# Patient Record
Sex: FEMALE | Race: OTHER | HISPANIC OR LATINO | URBAN - METROPOLITAN AREA
[De-identification: names, ages, dates, MRNs, and addresses within clinical notes are randomized per-mention and may not be internally consistent; named-entity substitution may affect disease eponyms.]

---

## 2023-06-21 ENCOUNTER — INPATIENT (INPATIENT)
Facility: HOSPITAL | Age: 28
LOS: 1 days | Discharge: HOME | End: 2023-06-23
Attending: OBSTETRICS & GYNECOLOGY | Admitting: OBSTETRICS & GYNECOLOGY
Payer: COMMERCIAL

## 2023-06-21 VITALS — DIASTOLIC BLOOD PRESSURE: 62 MMHG | TEMPERATURE: 98 F | SYSTOLIC BLOOD PRESSURE: 111 MMHG

## 2023-06-21 LAB
BASOPHILS # BLD AUTO: 0.07 K/UL — SIGNIFICANT CHANGE UP (ref 0–0.2)
BASOPHILS NFR BLD AUTO: 0.5 % — SIGNIFICANT CHANGE UP (ref 0–2)
BLD GP AB SCN SERPL QL: NEGATIVE — SIGNIFICANT CHANGE UP
COVID-19 SPIKE DOMAIN AB INTERP: POSITIVE
COVID-19 SPIKE DOMAIN ANTIBODY RESULT: >250 U/ML — HIGH
EOSINOPHIL # BLD AUTO: 0.39 K/UL — SIGNIFICANT CHANGE UP (ref 0–0.5)
EOSINOPHIL NFR BLD AUTO: 2.7 % — SIGNIFICANT CHANGE UP (ref 0–6)
HCT VFR BLD CALC: 38 % — SIGNIFICANT CHANGE UP (ref 34.5–45)
HGB BLD-MCNC: 11.8 G/DL — SIGNIFICANT CHANGE UP (ref 11.5–15.5)
IMM GRANULOCYTES NFR BLD AUTO: 1.7 % — HIGH (ref 0–0.9)
LYMPHOCYTES # BLD AUTO: 2.97 K/UL — SIGNIFICANT CHANGE UP (ref 1–3.3)
LYMPHOCYTES # BLD AUTO: 20.6 % — SIGNIFICANT CHANGE UP (ref 13–44)
MCHC RBC-ENTMCNC: 25.7 PG — LOW (ref 27–34)
MCHC RBC-ENTMCNC: 31.1 GM/DL — LOW (ref 32–36)
MCV RBC AUTO: 82.6 FL — SIGNIFICANT CHANGE UP (ref 80–100)
MONOCYTES # BLD AUTO: 1.01 K/UL — HIGH (ref 0–0.9)
MONOCYTES NFR BLD AUTO: 7 % — SIGNIFICANT CHANGE UP (ref 2–14)
NEUTROPHILS # BLD AUTO: 9.76 K/UL — HIGH (ref 1.8–7.4)
NEUTROPHILS NFR BLD AUTO: 67.5 % — SIGNIFICANT CHANGE UP (ref 43–77)
NRBC # BLD: 0 /100 WBCS — SIGNIFICANT CHANGE UP (ref 0–0)
PLATELET # BLD AUTO: 336 K/UL — SIGNIFICANT CHANGE UP (ref 150–400)
RBC # BLD: 4.6 M/UL — SIGNIFICANT CHANGE UP (ref 3.8–5.2)
RBC # FLD: 13.6 % — SIGNIFICANT CHANGE UP (ref 10.3–14.5)
RH IG SCN BLD-IMP: POSITIVE — SIGNIFICANT CHANGE UP
SARS-COV-2 IGG+IGM SERPL QL IA: >250 U/ML — HIGH
SARS-COV-2 IGG+IGM SERPL QL IA: POSITIVE
WBC # BLD: 14.45 K/UL — HIGH (ref 3.8–10.5)
WBC # FLD AUTO: 14.45 K/UL — HIGH (ref 3.8–10.5)

## 2023-06-21 DEVICE — ARISTA 3GR: Type: IMPLANTABLE DEVICE | Status: FUNCTIONAL

## 2023-06-21 RX ORDER — CEFAZOLIN SODIUM 1 G
2000 VIAL (EA) INJECTION ONCE
Refills: 0 | Status: COMPLETED | OUTPATIENT
Start: 2023-06-21 | End: 2023-06-21

## 2023-06-21 RX ORDER — OXYTOCIN 10 UNIT/ML
333.33 VIAL (ML) INJECTION
Qty: 20 | Refills: 0 | Status: DISCONTINUED | OUTPATIENT
Start: 2023-06-21 | End: 2023-06-21

## 2023-06-21 RX ORDER — CITRIC ACID/SODIUM CITRATE 300-500 MG
30 SOLUTION, ORAL ORAL ONCE
Refills: 0 | Status: COMPLETED | OUTPATIENT
Start: 2023-06-21 | End: 2023-06-21

## 2023-06-21 RX ORDER — OXYCODONE HYDROCHLORIDE 5 MG/1
5 TABLET ORAL ONCE
Refills: 0 | Status: DISCONTINUED | OUTPATIENT
Start: 2023-06-21 | End: 2023-06-23

## 2023-06-21 RX ORDER — ACETAMINOPHEN 500 MG
975 TABLET ORAL
Refills: 0 | Status: DISCONTINUED | OUTPATIENT
Start: 2023-06-21 | End: 2023-06-23

## 2023-06-21 RX ORDER — LANOLIN
1 OINTMENT (GRAM) TOPICAL EVERY 6 HOURS
Refills: 0 | Status: DISCONTINUED | OUTPATIENT
Start: 2023-06-21 | End: 2023-06-23

## 2023-06-21 RX ORDER — SODIUM CHLORIDE 9 MG/ML
1000 INJECTION, SOLUTION INTRAVENOUS ONCE
Refills: 0 | Status: DISCONTINUED | OUTPATIENT
Start: 2023-06-21 | End: 2023-06-21

## 2023-06-21 RX ORDER — OXYCODONE HYDROCHLORIDE 5 MG/1
5 TABLET ORAL
Refills: 0 | Status: DISCONTINUED | OUTPATIENT
Start: 2023-06-21 | End: 2023-06-23

## 2023-06-21 RX ORDER — OXYTOCIN 10 UNIT/ML
333.33 VIAL (ML) INJECTION
Qty: 20 | Refills: 0 | Status: DISCONTINUED | OUTPATIENT
Start: 2023-06-21 | End: 2023-06-23

## 2023-06-21 RX ORDER — ENOXAPARIN SODIUM 100 MG/ML
40 INJECTION SUBCUTANEOUS EVERY 24 HOURS
Refills: 0 | Status: DISCONTINUED | OUTPATIENT
Start: 2023-06-22 | End: 2023-06-23

## 2023-06-21 RX ORDER — SIMETHICONE 80 MG/1
80 TABLET, CHEWABLE ORAL EVERY 4 HOURS
Refills: 0 | Status: DISCONTINUED | OUTPATIENT
Start: 2023-06-21 | End: 2023-06-23

## 2023-06-21 RX ORDER — NALOXONE HYDROCHLORIDE 4 MG/.1ML
0.1 SPRAY NASAL
Refills: 0 | Status: DISCONTINUED | OUTPATIENT
Start: 2023-06-21 | End: 2023-06-21

## 2023-06-21 RX ORDER — DEXAMETHASONE 0.5 MG/5ML
4 ELIXIR ORAL EVERY 6 HOURS
Refills: 0 | Status: DISCONTINUED | OUTPATIENT
Start: 2023-06-21 | End: 2023-06-21

## 2023-06-21 RX ORDER — FAMOTIDINE 10 MG/ML
20 INJECTION INTRAVENOUS ONCE
Refills: 0 | Status: COMPLETED | OUTPATIENT
Start: 2023-06-21 | End: 2023-06-21

## 2023-06-21 RX ORDER — ONDANSETRON 8 MG/1
4 TABLET, FILM COATED ORAL EVERY 6 HOURS
Refills: 0 | Status: DISCONTINUED | OUTPATIENT
Start: 2023-06-21 | End: 2023-06-21

## 2023-06-21 RX ORDER — MAGNESIUM HYDROXIDE 400 MG/1
30 TABLET, CHEWABLE ORAL
Refills: 0 | Status: DISCONTINUED | OUTPATIENT
Start: 2023-06-21 | End: 2023-06-23

## 2023-06-21 RX ORDER — SODIUM CHLORIDE 9 MG/ML
1000 INJECTION, SOLUTION INTRAVENOUS
Refills: 0 | Status: DISCONTINUED | OUTPATIENT
Start: 2023-06-21 | End: 2023-06-23

## 2023-06-21 RX ORDER — SODIUM CHLORIDE 9 MG/ML
1000 INJECTION, SOLUTION INTRAVENOUS
Refills: 0 | Status: DISCONTINUED | OUTPATIENT
Start: 2023-06-21 | End: 2023-06-21

## 2023-06-21 RX ORDER — ACETAMINOPHEN 500 MG
1000 TABLET ORAL ONCE
Refills: 0 | Status: COMPLETED | OUTPATIENT
Start: 2023-06-21 | End: 2023-06-21

## 2023-06-21 RX ORDER — IBUPROFEN 200 MG
600 TABLET ORAL EVERY 6 HOURS
Refills: 0 | Status: COMPLETED | OUTPATIENT
Start: 2023-06-21 | End: 2024-05-19

## 2023-06-21 RX ORDER — TETANUS TOXOID, REDUCED DIPHTHERIA TOXOID AND ACELLULAR PERTUSSIS VACCINE, ADSORBED 5; 2.5; 8; 8; 2.5 [IU]/.5ML; [IU]/.5ML; UG/.5ML; UG/.5ML; UG/.5ML
0.5 SUSPENSION INTRAMUSCULAR ONCE
Refills: 0 | Status: DISCONTINUED | OUTPATIENT
Start: 2023-06-21 | End: 2023-06-23

## 2023-06-21 RX ORDER — KETOROLAC TROMETHAMINE 30 MG/ML
30 SYRINGE (ML) INJECTION EVERY 6 HOURS
Refills: 0 | Status: COMPLETED | OUTPATIENT
Start: 2023-06-21 | End: 2023-06-22

## 2023-06-21 RX ORDER — DIPHENHYDRAMINE HCL 50 MG
25 CAPSULE ORAL EVERY 6 HOURS
Refills: 0 | Status: DISCONTINUED | OUTPATIENT
Start: 2023-06-21 | End: 2023-06-23

## 2023-06-21 RX ADMIN — SODIUM CHLORIDE 125 MILLILITER(S): 9 INJECTION, SOLUTION INTRAVENOUS at 17:02

## 2023-06-21 RX ADMIN — Medication 400 MILLIGRAM(S): at 18:45

## 2023-06-21 RX ADMIN — Medication 975 MILLIGRAM(S): at 21:25

## 2023-06-21 RX ADMIN — Medication 100 MILLIGRAM(S): at 14:30

## 2023-06-21 RX ADMIN — Medication 975 MILLIGRAM(S): at 22:00

## 2023-06-21 RX ADMIN — Medication 30 MILLILITER(S): at 14:23

## 2023-06-21 RX ADMIN — SODIUM CHLORIDE 2000 MILLILITER(S): 9 INJECTION, SOLUTION INTRAVENOUS at 12:00

## 2023-06-21 RX ADMIN — FAMOTIDINE 20 MILLIGRAM(S): 10 INJECTION INTRAVENOUS at 14:22

## 2023-06-21 NOTE — PATIENT PROFILE OB - FUNCTIONAL ASSESSMENT - BASIC MOBILITY 6.
4-calculated by average/Not able to assess (calculate score using [facility] averaging method)

## 2023-06-22 LAB
BASOPHILS # BLD AUTO: 0 K/UL — SIGNIFICANT CHANGE UP (ref 0–0.2)
BASOPHILS NFR BLD AUTO: 0 % — SIGNIFICANT CHANGE UP (ref 0–2)
BURR CELLS BLD QL SMEAR: PRESENT — SIGNIFICANT CHANGE UP
EOSINOPHIL # BLD AUTO: 0 K/UL — SIGNIFICANT CHANGE UP (ref 0–0.5)
EOSINOPHIL NFR BLD AUTO: 0 % — SIGNIFICANT CHANGE UP (ref 0–6)
GIANT PLATELETS BLD QL SMEAR: PRESENT — SIGNIFICANT CHANGE UP
HCT VFR BLD CALC: 32.2 % — LOW (ref 34.5–45)
HGB BLD-MCNC: 9.9 G/DL — LOW (ref 11.5–15.5)
HYPOCHROMIA BLD QL: SLIGHT — SIGNIFICANT CHANGE UP
LYMPHOCYTES # BLD AUTO: 1.12 K/UL — SIGNIFICANT CHANGE UP (ref 1–3.3)
LYMPHOCYTES # BLD AUTO: 4.4 % — LOW (ref 13–44)
MANUAL SMEAR VERIFICATION: SIGNIFICANT CHANGE UP
MCHC RBC-ENTMCNC: 25.7 PG — LOW (ref 27–34)
MCHC RBC-ENTMCNC: 30.7 GM/DL — LOW (ref 32–36)
MCV RBC AUTO: 83.6 FL — SIGNIFICANT CHANGE UP (ref 80–100)
MONOCYTES # BLD AUTO: 2.64 K/UL — HIGH (ref 0–0.9)
MONOCYTES NFR BLD AUTO: 10.4 % — SIGNIFICANT CHANGE UP (ref 2–14)
NEUTROPHILS # BLD AUTO: 21.66 K/UL — HIGH (ref 1.8–7.4)
NEUTROPHILS NFR BLD AUTO: 85.2 % — HIGH (ref 43–77)
PLAT MORPH BLD: NORMAL — SIGNIFICANT CHANGE UP
PLATELET # BLD AUTO: 299 K/UL — SIGNIFICANT CHANGE UP (ref 150–400)
POIKILOCYTOSIS BLD QL AUTO: SLIGHT — SIGNIFICANT CHANGE UP
RBC # BLD: 3.85 M/UL — SIGNIFICANT CHANGE UP (ref 3.8–5.2)
RBC # FLD: 13.5 % — SIGNIFICANT CHANGE UP (ref 10.3–14.5)
RBC BLD AUTO: ABNORMAL
T PALLIDUM AB TITR SER: NEGATIVE — SIGNIFICANT CHANGE UP
WBC # BLD: 25.42 K/UL — HIGH (ref 3.8–10.5)
WBC # FLD AUTO: 25.42 K/UL — HIGH (ref 3.8–10.5)

## 2023-06-22 RX ORDER — IBUPROFEN 200 MG
600 TABLET ORAL EVERY 6 HOURS
Refills: 0 | Status: DISCONTINUED | OUTPATIENT
Start: 2023-06-22 | End: 2023-06-23

## 2023-06-22 RX ADMIN — Medication 975 MILLIGRAM(S): at 21:12

## 2023-06-22 RX ADMIN — Medication 600 MILLIGRAM(S): at 18:16

## 2023-06-22 RX ADMIN — Medication 30 MILLIGRAM(S): at 07:01

## 2023-06-22 RX ADMIN — Medication 1 TABLET(S): at 12:15

## 2023-06-22 RX ADMIN — Medication 975 MILLIGRAM(S): at 04:23

## 2023-06-22 RX ADMIN — Medication 30 MILLIGRAM(S): at 01:00

## 2023-06-22 RX ADMIN — ENOXAPARIN SODIUM 40 MILLIGRAM(S): 100 INJECTION SUBCUTANEOUS at 06:31

## 2023-06-22 RX ADMIN — Medication 975 MILLIGRAM(S): at 22:00

## 2023-06-22 RX ADMIN — Medication 975 MILLIGRAM(S): at 03:53

## 2023-06-22 RX ADMIN — Medication 30 MILLIGRAM(S): at 06:31

## 2023-06-22 RX ADMIN — Medication 975 MILLIGRAM(S): at 09:00

## 2023-06-22 RX ADMIN — Medication 975 MILLIGRAM(S): at 15:01

## 2023-06-22 RX ADMIN — Medication 30 MILLIGRAM(S): at 00:49

## 2023-06-22 RX ADMIN — Medication 30 MILLIGRAM(S): at 12:15

## 2023-06-23 VITALS
OXYGEN SATURATION: 98 % | DIASTOLIC BLOOD PRESSURE: 70 MMHG | SYSTOLIC BLOOD PRESSURE: 107 MMHG | TEMPERATURE: 99 F | RESPIRATION RATE: 18 BRPM | HEART RATE: 80 BPM

## 2023-06-23 PROCEDURE — 86900 BLOOD TYPING SEROLOGIC ABO: CPT

## 2023-06-23 PROCEDURE — C1889: CPT

## 2023-06-23 PROCEDURE — 85025 COMPLETE CBC W/AUTO DIFF WBC: CPT

## 2023-06-23 PROCEDURE — 59050 FETAL MONITOR W/REPORT: CPT

## 2023-06-23 PROCEDURE — 86780 TREPONEMA PALLIDUM: CPT

## 2023-06-23 PROCEDURE — 36415 COLL VENOUS BLD VENIPUNCTURE: CPT

## 2023-06-23 PROCEDURE — 86769 SARS-COV-2 COVID-19 ANTIBODY: CPT

## 2023-06-23 PROCEDURE — 86850 RBC ANTIBODY SCREEN: CPT

## 2023-06-23 PROCEDURE — 86901 BLOOD TYPING SEROLOGIC RH(D): CPT

## 2023-06-23 RX ORDER — ACETAMINOPHEN 500 MG
3 TABLET ORAL
Qty: 0 | Refills: 0 | DISCHARGE
Start: 2023-06-23

## 2023-06-23 RX ORDER — IBUPROFEN 200 MG
1 TABLET ORAL
Qty: 0 | Refills: 0 | DISCHARGE
Start: 2023-06-23

## 2023-06-23 RX ADMIN — Medication 600 MILLIGRAM(S): at 00:22

## 2023-06-23 RX ADMIN — ENOXAPARIN SODIUM 40 MILLIGRAM(S): 100 INJECTION SUBCUTANEOUS at 05:56

## 2023-06-23 RX ADMIN — Medication 600 MILLIGRAM(S): at 05:55

## 2023-06-23 RX ADMIN — Medication 600 MILLIGRAM(S): at 01:00

## 2023-06-23 RX ADMIN — Medication 1 TABLET(S): at 14:15

## 2023-06-23 RX ADMIN — Medication 975 MILLIGRAM(S): at 14:15

## 2023-06-23 RX ADMIN — Medication 975 MILLIGRAM(S): at 04:00

## 2023-06-23 RX ADMIN — Medication 975 MILLIGRAM(S): at 03:37

## 2023-06-23 RX ADMIN — Medication 975 MILLIGRAM(S): at 09:41

## 2023-06-23 RX ADMIN — Medication 600 MILLIGRAM(S): at 06:55

## 2023-06-23 RX ADMIN — Medication 600 MILLIGRAM(S): at 11:59

## 2023-06-23 NOTE — PROGRESS NOTE ADULT - SUBJECTIVE AND OBJECTIVE BOX
Patient evaluated at bedside this morning, resting comfortable in bed, with no acute events overnight.  She reports pain is well controlled with oral pain medications.   She denies headache, dizziness, chest pain, palpitations, shortness of breath, nausea, vomiting or heavy vaginal bleeding.  She has not tried ambulating since procedure, frederick remains in place at this time. Tolerating clear liquids.     Physical Exam:  Vital Signs Last 24 Hrs  T(C): 36.6 (22 Jun 2023 06:27), Max: 36.9 (22 Jun 2023 01:54)  T(F): 97.8 (22 Jun 2023 06:27), Max: 98.4 (22 Jun 2023 01:54)  HR: 60 (22 Jun 2023 06:27) (60 - 82)  BP: 105/68 (22 Jun 2023 06:27) (101/65 - 125/60)  BP(mean): 85 (21 Jun 2023 17:14) (77 - 85)  RR: 18 (22 Jun 2023 06:27) (16 - 18)  SpO2: 99% (22 Jun 2023 06:27) (97% - 100%)    Parameters below as of 22 Jun 2023 06:27  Patient On (Oxygen Delivery Method): room air        GA: NAD, A+0 x 3  Pulm: no increased WOB  Abd: soft, nontender, nondistended, no rebound or guarding, incision clean, dry and intact, uterus firm at midline, 2 fb below umbilicus  : frederick in situ, lochia WNL  Extremities: no swelling or calf tenderness, SCDs in place                            9.9    25.42 )-----------( 299      ( 22 Jun 2023 05:30 )             32.2               acetaminophen     Tablet .. 975 milliGRAM(s) Oral <User Schedule>  diphenhydrAMINE 25 milliGRAM(s) Oral every 6 hours PRN  diphtheria/tetanus/pertussis (acellular) Vaccine (Adacel) 0.5 milliLiter(s) IntraMuscular once  enoxaparin Injectable 40 milliGRAM(s) SubCutaneous every 24 hours  ibuprofen  Tablet. 600 milliGRAM(s) Oral every 6 hours  ketorolac   Injectable 30 milliGRAM(s) IV Push every 6 hours  lactated ringers. 1000 milliLiter(s) IV Continuous <Continuous>  lanolin Ointment 1 Application(s) Topical every 6 hours PRN  magnesium hydroxide Suspension 30 milliLiter(s) Oral two times a day PRN  oxyCODONE    IR 5 milliGRAM(s) Oral every 3 hours PRN  oxyCODONE    IR 5 milliGRAM(s) Oral once PRN  oxytocin Infusion 333.333 milliUNIT(s)/Min IV Continuous <Continuous>  prenatal multivitamin 1 Tablet(s) Oral daily  simethicone 80 milliGRAM(s) Chew every 4 hours PRN  
Patient evaluated at bedside this morning, resting comfortable in bed.   She reports pain is well controlled with oral pain medications.   She denies headache, dizziness, chest pain, palpitations, shortness of breath, nausea, vomiting or heavy vaginal bleeding.  She has been ambulating without assistance, voiding spontaneously, passing gas, tolerating regular diet.     Physical Exam:  Vital Signs Last 24 Hrs  T(C): 37.1 (23 Jun 2023 06:07), Max: 37.1 (22 Jun 2023 22:43)  T(F): 98.7 (23 Jun 2023 06:07), Max: 98.8 (22 Jun 2023 22:43)  HR: 80 (23 Jun 2023 06:07) (70 - 88)  BP: 107/70 (23 Jun 2023 06:07) (102/63 - 107/70)  BP(mean): 82 (23 Jun 2023 06:07) (82 - 82)  RR: 18 (23 Jun 2023 06:07) (18 - 18)  SpO2: 98% (23 Jun 2023 06:07) (98% - 100%)    Parameters below as of 23 Jun 2023 06:07  Patient On (Oxygen Delivery Method): room air        GA: NAD, A+0 x 3  Pulm: no increased WOB  Abd: soft, nontender, nondistended, no rebound or guarding, incision clean, dry and intact, uterus firm at midline, 2 fb below umbilicus  Extremities: no swelling or calf tenderness                             9.9    25.42 )-----------( 299      ( 22 Jun 2023 05:30 )             32.2

## 2023-06-23 NOTE — DISCHARGE NOTE OB - PATIENT PORTAL LINK FT
You can access the FollowMyHealth Patient Portal offered by [facility] by registering at the following website: [email or web address]. By joining [employer]’s FollowMyHealth portal, you will also be able to view your health information using other applications (apps) compatible with our system.

## 2023-06-23 NOTE — DISCHARGE NOTE OB - CARE PROVIDER_API CALL
Juan Luis Villegas  Obstetrics and Gynecology  [address] [place] [address]  Phone: [phone]  Fax: [phone]  Follow Up Time: 2 weeks

## 2023-06-23 NOTE — DISCHARGE NOTE OB - NS MD DC FALL RISK RISK
For information on Fall & Injury Prevention, visit: [email or web address] OR  [email or web address] OR  https://www.cdc.gov/steadi/patient.html

## 2023-06-23 NOTE — DISCHARGE NOTE OB - MEDICATION SUMMARY - MEDICATIONS TO TAKE
I will START or STAY ON the medications listed below when I get home from the hospital:    ibuprofen 600 mg oral tablet  -- 1 tab(s) by mouth every 6 hours  -- Indication: For Pain    acetaminophen 325 mg oral tablet  -- 3 tab(s) by mouth every 6 hours  -- Indication: For Pain    Prenatal Multivitamins with Folic Acid 1 mg oral tablet  -- 1 tab(s) by mouth once a day  -- Indication: For Postpartum

## 2023-06-23 NOTE — PROGRESS NOTE ADULT - ASSESSMENT
A/P: 28y s/p  section, POD#2, stable  -  Pain: PO motrin q6hrs, tylenol q8hrs, oxycodone for severe pain PRN  -  Post-operatively labs: post-op Hgb , hemodynamically stable, no symptoms of anemia   -  GI: tolerating regular diet, passing gas  -  : s/p frederick , urinating without difficulty  -  DVT prophylaxis: encouraged increased ambulation, SCDs, SQL  -  Dispo: POD 3 or 4
28y Female POD#1  s/p C/S, Uncomplicated                                       - Neuro/Pain:  toradol atc, tylenol atc, oxy prn  - CV:  VS per routine, AM CBC pending  - Pulm: Encourage ISS & Ambulation  - GI: Advance as tolerated  - : Bowman in place, to be removed this morning, TOV this afternoon  - DVT ppx: SCDs, Lovenox 40mg QD  - Dispo: POD #2/3

## 2023-07-05 DIAGNOSIS — Z3A.39 39 WEEKS GESTATION OF PREGNANCY: ICD-10-CM

## 2023-07-05 DIAGNOSIS — D62 ACUTE POSTHEMORRHAGIC ANEMIA: ICD-10-CM

## 2023-07-05 DIAGNOSIS — Z34.83 ENCOUNTER FOR SUPERVISION OF OTHER NORMAL PREGNANCY, THIRD TRIMESTER: ICD-10-CM

## 2023-07-05 DIAGNOSIS — O34.219 MATERNAL CARE FOR UNSPECIFIED TYPE SCAR FROM PREVIOUS CESAREAN DELIVERY: ICD-10-CM

## 2025-07-08 ENCOUNTER — EMERGENCY (EMERGENCY)
Facility: HOSPITAL | Age: 30
LOS: 1 days | End: 2025-07-08
Attending: EMERGENCY MEDICINE | Admitting: STUDENT IN AN ORGANIZED HEALTH CARE EDUCATION/TRAINING PROGRAM
Payer: COMMERCIAL

## 2025-07-08 ENCOUNTER — EMERGENCY (EMERGENCY)
Facility: HOSPITAL | Age: 30
LOS: 1 days | End: 2025-07-08
Attending: EMERGENCY MEDICINE | Admitting: EMERGENCY MEDICINE
Payer: COMMERCIAL

## 2025-07-08 VITALS
DIASTOLIC BLOOD PRESSURE: 71 MMHG | HEART RATE: 86 BPM | OXYGEN SATURATION: 100 % | RESPIRATION RATE: 16 BRPM | HEIGHT: 64 IN | WEIGHT: 139.99 LBS | SYSTOLIC BLOOD PRESSURE: 110 MMHG | TEMPERATURE: 98 F

## 2025-07-08 VITALS
RESPIRATION RATE: 14 BRPM | DIASTOLIC BLOOD PRESSURE: 70 MMHG | SYSTOLIC BLOOD PRESSURE: 120 MMHG | HEART RATE: 88 BPM | OXYGEN SATURATION: 100 %

## 2025-07-08 VITALS
OXYGEN SATURATION: 100 % | HEIGHT: 64 IN | TEMPERATURE: 98 F | HEART RATE: 98 BPM | SYSTOLIC BLOOD PRESSURE: 119 MMHG | DIASTOLIC BLOOD PRESSURE: 80 MMHG | RESPIRATION RATE: 16 BRPM | WEIGHT: 149.91 LBS

## 2025-07-08 VITALS
SYSTOLIC BLOOD PRESSURE: 107 MMHG | RESPIRATION RATE: 20 BRPM | HEART RATE: 72 BPM | OXYGEN SATURATION: 99 % | DIASTOLIC BLOOD PRESSURE: 73 MMHG | TEMPERATURE: 100 F

## 2025-07-08 DIAGNOSIS — O99.891 OTHER SPECIFIED DISEASES AND CONDITIONS COMPLICATING PREGNANCY: ICD-10-CM

## 2025-07-08 DIAGNOSIS — R10.32 LEFT LOWER QUADRANT PAIN: ICD-10-CM

## 2025-07-08 LAB
ALBUMIN SERPL ELPH-MCNC: 3.5 G/DL — SIGNIFICANT CHANGE UP (ref 3.4–5)
ALP SERPL-CCNC: 85 U/L — SIGNIFICANT CHANGE UP (ref 40–120)
ALT FLD-CCNC: 27 U/L — SIGNIFICANT CHANGE UP (ref 12–42)
ANION GAP SERPL CALC-SCNC: 7 MMOL/L — LOW (ref 9–16)
APPEARANCE UR: CLEAR — SIGNIFICANT CHANGE UP
AST SERPL-CCNC: 28 U/L — SIGNIFICANT CHANGE UP (ref 15–37)
BASOPHILS # BLD AUTO: 0.08 K/UL — SIGNIFICANT CHANGE UP (ref 0–0.2)
BASOPHILS NFR BLD AUTO: 0.5 % — SIGNIFICANT CHANGE UP (ref 0–2)
BILIRUB SERPL-MCNC: 0.7 MG/DL — SIGNIFICANT CHANGE UP (ref 0.2–1.2)
BILIRUB UR-MCNC: NEGATIVE — SIGNIFICANT CHANGE UP
BLD GP AB SCN SERPL QL: NEGATIVE — SIGNIFICANT CHANGE UP
BUN SERPL-MCNC: 11 MG/DL — SIGNIFICANT CHANGE UP (ref 7–23)
CALCIUM SERPL-MCNC: 8.7 MG/DL — SIGNIFICANT CHANGE UP (ref 8.5–10.5)
CHLORIDE SERPL-SCNC: 105 MMOL/L — SIGNIFICANT CHANGE UP (ref 96–108)
CO2 SERPL-SCNC: 24 MMOL/L — SIGNIFICANT CHANGE UP (ref 22–31)
COLOR SPEC: YELLOW — SIGNIFICANT CHANGE UP
CREAT SERPL-MCNC: 0.77 MG/DL — SIGNIFICANT CHANGE UP (ref 0.5–1.3)
DIFF PNL FLD: ABNORMAL
EGFR: 105 ML/MIN/1.73M2 — SIGNIFICANT CHANGE UP
EGFR: 105 ML/MIN/1.73M2 — SIGNIFICANT CHANGE UP
EOSINOPHIL # BLD AUTO: 1.05 K/UL — HIGH (ref 0–0.5)
EOSINOPHIL NFR BLD AUTO: 7.1 % — HIGH (ref 0–6)
GLUCOSE SERPL-MCNC: 101 MG/DL — HIGH (ref 70–99)
GLUCOSE UR QL: NEGATIVE MG/DL — SIGNIFICANT CHANGE UP
HCG SERPL-ACNC: 545 MIU/ML — HIGH
HCG UR QL: POSITIVE — SIGNIFICANT CHANGE UP
HCT VFR BLD CALC: 42.2 % — SIGNIFICANT CHANGE UP (ref 34.5–45)
HGB BLD-MCNC: 13.5 G/DL — SIGNIFICANT CHANGE UP (ref 11.5–15.5)
IMM GRANULOCYTES # BLD AUTO: 0.06 K/UL — SIGNIFICANT CHANGE UP (ref 0–0.07)
IMM GRANULOCYTES NFR BLD AUTO: 0.4 % — SIGNIFICANT CHANGE UP (ref 0–0.9)
INR BLD: 1.08 — SIGNIFICANT CHANGE UP (ref 0.85–1.16)
KETONES UR QL: NEGATIVE MG/DL — SIGNIFICANT CHANGE UP
LEUKOCYTE ESTERASE UR-ACNC: NEGATIVE — SIGNIFICANT CHANGE UP
LIDOCAIN IGE QN: 58 U/L — SIGNIFICANT CHANGE UP (ref 16–77)
LYMPHOCYTES # BLD AUTO: 3.71 K/UL — HIGH (ref 1–3.3)
LYMPHOCYTES NFR BLD AUTO: 25.2 % — SIGNIFICANT CHANGE UP (ref 13–44)
MCHC RBC-ENTMCNC: 27.8 PG — SIGNIFICANT CHANGE UP (ref 27–34)
MCHC RBC-ENTMCNC: 32 G/DL — SIGNIFICANT CHANGE UP (ref 32–36)
MCV RBC AUTO: 87 FL — SIGNIFICANT CHANGE UP (ref 80–100)
MONOCYTES # BLD AUTO: 1.19 K/UL — HIGH (ref 0–0.9)
MONOCYTES NFR BLD AUTO: 8.1 % — SIGNIFICANT CHANGE UP (ref 2–14)
NEUTROPHILS # BLD AUTO: 8.63 K/UL — HIGH (ref 1.8–7.4)
NEUTROPHILS NFR BLD AUTO: 58.7 % — SIGNIFICANT CHANGE UP (ref 43–77)
NITRITE UR-MCNC: NEGATIVE — SIGNIFICANT CHANGE UP
NRBC # BLD AUTO: 0.02 K/UL — HIGH (ref 0–0)
NRBC # FLD: 0.02 K/UL — HIGH (ref 0–0)
NRBC BLD AUTO-RTO: 0 /100 WBCS — SIGNIFICANT CHANGE UP (ref 0–0)
PH UR: 6.5 — SIGNIFICANT CHANGE UP (ref 5–8)
PLATELET # BLD AUTO: 419 K/UL — HIGH (ref 150–400)
PMV BLD: 9.7 FL — SIGNIFICANT CHANGE UP (ref 7–13)
POTASSIUM SERPL-MCNC: 3.7 MMOL/L — SIGNIFICANT CHANGE UP (ref 3.5–5.3)
POTASSIUM SERPL-SCNC: 3.7 MMOL/L — SIGNIFICANT CHANGE UP (ref 3.5–5.3)
PROT SERPL-MCNC: 7.4 G/DL — SIGNIFICANT CHANGE UP (ref 6.4–8.2)
PROT UR-MCNC: NEGATIVE MG/DL — SIGNIFICANT CHANGE UP
PROTHROM AB SERPL-ACNC: 12.6 SEC — SIGNIFICANT CHANGE UP (ref 9.9–13.4)
RBC # BLD: 4.85 M/UL — SIGNIFICANT CHANGE UP (ref 3.8–5.2)
RBC # FLD: 12.9 % — SIGNIFICANT CHANGE UP (ref 10.3–14.5)
RH IG SCN BLD-IMP: POSITIVE — SIGNIFICANT CHANGE UP
RH IG SCN BLD-IMP: POSITIVE — SIGNIFICANT CHANGE UP
SODIUM SERPL-SCNC: 136 MMOL/L — SIGNIFICANT CHANGE UP (ref 132–145)
SP GR SPEC: 1.01 — SIGNIFICANT CHANGE UP (ref 1–1.03)
UROBILINOGEN FLD QL: 0.2 MG/DL — SIGNIFICANT CHANGE UP (ref 0.2–1)
WBC # BLD: 14.72 K/UL — HIGH (ref 3.8–10.5)
WBC # FLD AUTO: 14.72 K/UL — HIGH (ref 3.8–10.5)

## 2025-07-08 PROCEDURE — 76817 TRANSVAGINAL US OBSTETRIC: CPT

## 2025-07-08 PROCEDURE — 99284 EMERGENCY DEPT VISIT MOD MDM: CPT | Mod: 25

## 2025-07-08 PROCEDURE — 99285 EMERGENCY DEPT VISIT HI MDM: CPT

## 2025-07-08 PROCEDURE — 76817 TRANSVAGINAL US OBSTETRIC: CPT | Mod: 26

## 2025-07-08 PROCEDURE — 76801 OB US < 14 WKS SINGLE FETUS: CPT | Mod: 26

## 2025-07-08 PROCEDURE — 76801 OB US < 14 WKS SINGLE FETUS: CPT

## 2025-07-08 RX ORDER — ACETAMINOPHEN 500 MG/5ML
975 LIQUID (ML) ORAL ONCE
Refills: 0 | Status: COMPLETED | OUTPATIENT
Start: 2025-07-08 | End: 2025-07-08

## 2025-07-08 RX ORDER — ACETAMINOPHEN 500 MG/5ML
650 LIQUID (ML) ORAL ONCE
Refills: 0 | Status: COMPLETED | OUTPATIENT
Start: 2025-07-08 | End: 2025-07-08

## 2025-07-08 RX ORDER — METHOTREXATE 25 MG/ML
84 INJECTION, SOLUTION INTRA-ARTERIAL; INTRAMUSCULAR; INTRATHECAL; INTRAVENOUS ONCE
Refills: 0 | Status: COMPLETED | OUTPATIENT
Start: 2025-07-08 | End: 2025-07-08

## 2025-07-08 RX ADMIN — Medication 4 MILLIGRAM(S): at 02:52

## 2025-07-08 RX ADMIN — METHOTREXATE 84 MILLIGRAM(S): 25 INJECTION, SOLUTION INTRA-ARTERIAL; INTRAMUSCULAR; INTRATHECAL; INTRAVENOUS at 08:46

## 2025-07-08 RX ADMIN — Medication 650 MILLIGRAM(S): at 02:35

## 2025-07-08 RX ADMIN — Medication 4 MILLIGRAM(S): at 04:09

## 2025-07-08 RX ADMIN — Medication 650 MILLIGRAM(S): at 02:00

## 2025-07-08 RX ADMIN — Medication 975 MILLIGRAM(S): at 07:23

## 2025-07-08 NOTE — ED PROVIDER NOTE - NSFOLLOWUPINSTRUCTIONS_ED_ALL_ED_FT
return to ED on friday 7/11 for your day 4 repeat hormone check. you will also need to come again on monday 7/14  Return to ED sooner for heavy bleeding (soaking 1 pad per hour), severe pain, dizziness, nausea, vomiting or any other concerning symptoms     Ectopic Pregnancy    An ectopic pregnancy is the term for a pregnancy that is not in the correct location. This can become a life-threatening emergency if the ectopic pregnancy causes heavy internal or vaginal bleeding. An ectopic pregnancy cannot continue to term and must be managed by your obstetrician. This can cause issues with fertility and future pregnancies.    SEEK IMMEDIATE MEDICAL CARE IF YOU HAVE ANY OF THE FOLLOWING SYMPTOMS: heavy vaginal bleeding, severe low back or abdominal cramps, fever/chills, lightheadedness/dizziness, or fainting.

## 2025-07-08 NOTE — ED PROVIDER NOTE - PROGRESS NOTE DETAILS
SHANNAN: Patient signed out to me by Dr. Devine. Positive home pregnancy test. Awaiting labs. Positive urine pregnancy. [facility] contacted for ED to ED transfer for US to r/o ectopic pregnancy.

## 2025-07-08 NOTE — ED PROVIDER NOTE - OBJECTIVE STATEMENT
30-year-old woman  presents with left lower quadrant and left pelvic pain.  Last menstrual cycle was approximately 5 weeks ago, 12 days ago, patient took a pregnancy test that was positive.  Patient has not yet seen her OB for this pregnancy.  Sexually active only with her , no vaginal discharge.  Patient has had intermittent vaginal spotting over the last several days, no severe bleeding, not soaking through multiple pads per day.  No flank pain, no fever, no other symptoms at this time.

## 2025-07-08 NOTE — ED ADULT NURSE NOTE - NS ED NURSE DC PT EDUCATION RESOURCES
Numerous subpleural and  parenchymal lung nodules in bilateral lung lobes, largest on right 11mm and left 9 mm. However patient has known history of pulmonary nodules, gets yearly CT scans for monitoring. Has a pulmonologist at [facility] Dr. Castaneda. Daughter to bring in report/CDs of prior scans for comparison  - obtain collateral from patient's pulmonologist  - Pt may need IR guided biopsy of pulmonary nodule   - Pulm consult in AM
Yes

## 2025-07-08 NOTE — ED PROVIDER NOTE - PATIENT PORTAL LINK FT
You can access the FollowMyHealth Patient Portal offered by [facility] by registering at the following website: [email or web address]. By joining [employer]’s FollowMyHealth portal, you will also be able to view your health information using other applications (apps) compatible with our system.

## 2025-07-08 NOTE — ED PROVIDER NOTE - CLINICAL SUMMARY MEDICAL DECISION MAKING FREE TEXT BOX
30F denies PMH, , LMP end of May w/ +home pregnancy test, presents for eval IUP. Pt initially presented to [facility] w/ LLQ pain that started suddenly at ~2200. Prior to that pt was having vaginal spotting x10 days, slightly heavier for last 2 days. No other systemic symptoms. WBC 14.72, plt 419, hcg 545, other labs grossly wnl. UA moderate blood. Rh+. Received Tylenol, morphine and transferred to [facility] ED for US. Pt still w/ pain but improved.   Vitals wnl, exam as above.   ddx: r/o ectopic.   US, reassess.

## 2025-07-08 NOTE — ED ADULT TRIAGE NOTE - CHIEF COMPLAINT QUOTE
transfer from [facility] with pelvic pain and vag bleed; sent to r/o ovarian torsion

## 2025-07-08 NOTE — ED ADULT TRIAGE NOTE - CHIEF COMPLAINT QUOTE
Patient walked in to [place] triage with c/o LLQ pain. Patient took home pregnancy test 12 days ago and it was positive. Since pregnancy test, she has been spotting. Tonight sharp pain started and took her breath.

## 2025-07-08 NOTE — ED PROVIDER NOTE - CLINICAL SUMMARY MEDICAL DECISION MAKING FREE TEXT BOX
Will obtain urine pregnancy test, as well as quantitative hCG, labs including coags, urinalysis.  Bedside ultrasound shows no gestational sac, no double decidual sign, left ovary well-visualized on bedside trans abdominal ultrasound and does not show any large cysts/developing fetus.  Right ovary poorly visualized.  Will likely require transvaginal ultrasound if pregnancy test is positive.  Signed out to overnight team.

## 2025-07-08 NOTE — CONSULT NOTE ADULT - ASSESSMENT
29yo  @6w0d by LMP  presents with vaginal bleeding and LLQ pain found to have L adnexal ectopic pregnancy.   -bhcg   -O+ no need for rhogam   -BMP stable, CBC stable  -TVUS shows: No visible intrauterine gestation. Possible 1.8 cm left adnexal ectopic gestation.  -VSS   -Pt counseled on medical vs surgical treatment options for ectopic pregnancy. Risks of MTX including but not limited to GI complaints, possible rupture of ectopic, possible need for re-dose, and possible need for surgery discussed. Risk of surgery including infection, bleeding, damage to adjacent organs, scarring with salpingostomy, and possible removal of fallopian tube. She is a candidate for Methotrexate tx. Methotrexate   84  mg IM once into buttock. Pt does not have contraindications to MTX including blood dyscrasia, active pulmonary disease, hepatic /renal disease, or hematologic dysfunstion. Pt was counseled on avoiding folic acid containing foods, prenatal vitamins, alcohol, breastfeeding, as well as intercourse. Pt understands she cannot attempt to conceive for at least 3 months. Pt to follow up on  Day 4     for repeat bHCG check.    -Vital signs stable, bHCG of 545 below discriminatory zone, concern for ectopic. Pt to return to ED for beta hCG check Day 4). Pt to come to ED immediately if she has symptoms of ectopic pregnancy rupture including but not limiting to severe abdominal pain, heavy vaginal bleeding, lightheadedness, dizziness or palpitations. Pt states she will return for repeat bHCG check on Friday  -Pt advised to go to nearest ED if fever >100.4, severe abdominal pain, or heavy vaginal bleeding   - Pt discussed with Dr Roe

## 2025-07-08 NOTE — ED ADULT NURSE NOTE - OBJECTIVE STATEMENT
30y Female pt transferred from [facility] here to r/o ovarian torsion. Pt reports of left lower pelvic pain and vaginal bleed. Pt was given morphine IV at 2am in [facility].

## 2025-07-08 NOTE — ED PROVIDER NOTE - OBJECTIVE STATEMENT
30F denies PMH, , LMP end of May w/ +home pregnancy test, presents for eval IUP. Pt initially presented to [facility] w/ LLQ pain that started suddenly at ~2200. Prior to that pt was having vaginal spotting x10 days, slightly heavier for last 2 days. No other systemic symptoms. WBC 14.72, plt 419, hcg 545, other labs grossly wnl. UA moderate blood. Rh+. Received Tylenol, morphine and transferred to [facility] ED for US. Pt still w/ pain but improved.   Denies fevers, chills, rhinorrhea, cough, sore throat, back pain, lightheaded, SOB/CP, NVD, urinary complaints, black/bloody stool, bleeding from elsewhere. Not on AC.

## 2025-07-08 NOTE — ED PROVIDER NOTE - CROS ED ROS STATEMENT
"Chief Complaint   Patient presents with     Cough     follow up on chronic cough     Throat Problem     horseness       Initial BP (!) 168/104  Pulse 84  Temp 97.5  F (36.4  C) (Temporal)  Resp 16  Ht 5' 10\" (1.778 m)  Wt 218 lb (98.9 kg)  SpO2 96%  BMI 31.28 kg/m2 Estimated body mass index is 31.28 kg/(m^2) as calculated from the following:    Height as of this encounter: 5' 10\" (1.778 m).    Weight as of this encounter: 218 lb (98.9 kg).  Medication Reconciliation: complete    " all other ROS negative except as per HPI

## 2025-07-08 NOTE — ED ADULT NURSE NOTE - OBJECTIVE STATEMENT
Pt presents to ed stating sudden onset LLQ pain severe, vaginal spotting. +UPT, LMP 25. . AOX4. ambulatory. skin pwd. resp easy. non TTP. PIV placed, labs sent.

## 2025-07-08 NOTE — ED PROVIDER NOTE - PHYSICAL EXAMINATION
Constitutional: awake and alert, in no acute distress  HEENT: head normocephalic and atraumatic. moist mucous membranes  Eyes: extraocular movements intact, normal conjunctiva  Neck: supple, normal ROM  Cardiovascular: regular rate   Pulmonary: no respiratory distress  Gastrointestinal: abdomen flat and nondistended  Skin: warm, dry, normal for ethnicity  Musculoskeletal: no edema, no deformity  Neurological: oriented x4, no focal neurologic deficit.   Psychiatric: calm and cooperative    Left lower quadrant and left pelvic/suprapubic tenderness palpation

## 2025-07-08 NOTE — ED ADULT NURSE NOTE - CHIEF COMPLAINT QUOTE
Patient walked in to [place] triage with c/o LLQ pain. Patient took home pregnancy test 12 days ago and it was positive. Since pregnancy test, she has been spotting. Tonight sharp pain started and took her breath.

## 2025-07-08 NOTE — CONSULT NOTE ADULT - SUBJECTIVE AND OBJECTIVE BOX
29yo  @6w0d by LMP of  presents to the ER complaining of LLQ Pain x1d and 10d of vaginal brown discharge and now red blood. Pt states her pain required pain medication here but overall is tolerable. Pt denies fever, chills, chest pain, SOB, nausea, vomiting.    OB/GYN Hx:  G1:  arrest of dilation requiring pc/s G2:  rpt c/s largest baby 9lbs. G3 chemical. G4: CURRENT   Last PAP smear: around ? normal.     PMHx: denies  SHx: denies  Meds:  none   Allergies: NKDA    Social hx:  at bedside, going to [place] in 3w     PHYSICAL EXAM:   Vital Signs Last 24 Hrs  T(C): 37.6 (2025 09:17), Max: 37.6 (2025 09:17)  T(F): 99.7 (2025 09:17), Max: 99.7 (2025 09:17)  HR: 72 (2025 09:17) (72 - 98)  BP: 107/73 (2025 09:17) (107/73 - 120/79)  RR: 20 (2025 09:17) (14 - 20)  SpO2: 99% (2025 09:17) (99% - 100%)    Parameters below as of 2025 09:17  Patient On (Oxygen Delivery Method): room air        **************************  Constitutional: Alert & Oriented x3, No acute distress  Respiratory: Clear to ausculation bilaterally; no wheezing, rhonchi, or crackles  Cardiovascular: regular rate and rhythm, no murmurs, or gallops  Gastrointestinal: soft, non tender, positive bowel sounds, no rebound or guarding BENIGN  Pelvic exam: some old blood in vault.   Extremities: no calf tenderness or swelling    LABS:                        13.5   14.72 )-----------( 419      ( 2025 01:42 )             42.2     07-08    136  |  105  |  11  ----------------------------<  101[H]  3.7   |  24  |  0.77    Ca    8.7      2025 01:42    TPro  7.4  /  Alb  3.5  /  TBili  0.7  /  DBili  x   /  AST  28  /  ALT  27  /  AlkPhos  85      PT/INR - ( 2025 01:42 )   PT: 12.6 sec;   INR: 1.08            Urinalysis Basic - ( 2025 01:42 )    Color: Yellow / Appearance: Clear / S.012 / pH: x  Gluc: 101 mg/dL / Ketone: x  / Bili: Negative / Urobili: 0.2 mg/dL   Blood: x / Protein: Negative mg/dL / Nitrite: Negative   Leuk Esterase: Negative / RBC: 1 /HPF / WBC 1 /HPF   Sq Epi: x / Non Sq Epi: x / Bacteria: Negative /HPF      HCG Quantitative, Serum: 545 mIU/mL ( @ 01:42)      RADIOLOGY & ADDITIONAL STUDIES:  ACC: [id number] EXAM:  US OB TRANSVAGINAL   ORDERED BY: GOMEZ MANN     ACC: [id number] EXAM:  US OB LES THAN 14 WKS 1ST GEST   ORDERED BY: GOMEZ MANN     PROCEDURE DATE:  2025          INTERPRETATION:  CLINICAL INFORMATION: Left-sided pelvic pain with   spotting. Beta-hCG 545 mIU/mL on 2025.    LMP: 2025. .    Estimated Gestational Age by LMP: 6 weeks 2 days    COMPARISON: None available.    TECHNIQUE: Endovaginal and transabdominal pelvic sonogram. Color and   Spectral Doppler was performed.    FINDINGS:  Uterus: 9.3 x 4.1 x 4.9 cm no visible intrauterine pregnancy. Endometrium   0.7 cm in thickness.  scar.  Cervix: 3.5 cm in length.    Right ovary: 3.4 cm x 1.6 cm x 1.9 cm. Volume of 5.2 cc Within normal   limits. Normal arterial and venous waveforms.  Left ovary: 3.2 cm x 1.7 cm x 2.1 cm. Volume of 6.6 cc. Corpus luteum   measuring up to 1.4 cm. Normal arterial and venous waveforms.1.8 x 1.2 x   1.6 cm adnexal structure measured adjacent to the left ovary, possibly an   ectopic gestation.    Fluid: Mild fluid visualized in the posterior cul-de-sac.    IMPRESSION:    No visible intrauterine gestation.    Possible 1.8 cm left adnexal ectopic gestation.      --- End of Report ---          TORY MCKEON MD; Resident Radiologist  This document has been electronically signed.  RODOLFO MEZA MD; Attending Radiologist  This document has been electronically signed. 2025  8:40AM

## 2025-07-10 DIAGNOSIS — Z3A.01 LESS THAN 8 WEEKS GESTATION OF PREGNANCY: ICD-10-CM

## 2025-07-10 DIAGNOSIS — O20.9 HEMORRHAGE IN EARLY PREGNANCY, UNSPECIFIED: ICD-10-CM

## 2025-07-10 DIAGNOSIS — O00.90 UNSPECIFIED ECTOPIC PREGNANCY WITHOUT INTRAUTERINE PREGNANCY: ICD-10-CM

## 2025-07-10 DIAGNOSIS — O99.891 OTHER SPECIFIED DISEASES AND CONDITIONS COMPLICATING PREGNANCY: ICD-10-CM

## 2025-07-10 DIAGNOSIS — R10.32 LEFT LOWER QUADRANT PAIN: ICD-10-CM

## 2025-07-11 ENCOUNTER — EMERGENCY (EMERGENCY)
Facility: HOSPITAL | Age: 30
LOS: 1 days | End: 2025-07-11
Attending: EMERGENCY MEDICINE | Admitting: EMERGENCY MEDICINE
Payer: COMMERCIAL

## 2025-07-11 VITALS
TEMPERATURE: 98 F | SYSTOLIC BLOOD PRESSURE: 112 MMHG | HEART RATE: 86 BPM | RESPIRATION RATE: 16 BRPM | WEIGHT: 154.98 LBS | OXYGEN SATURATION: 100 % | DIASTOLIC BLOOD PRESSURE: 77 MMHG | HEIGHT: 64 IN

## 2025-07-11 DIAGNOSIS — O00.202 LEFT OVARIAN PREGNANCY WITHOUT INTRAUTERINE PREGNANCY: ICD-10-CM

## 2025-07-11 DIAGNOSIS — R10.32 LEFT LOWER QUADRANT PAIN: ICD-10-CM

## 2025-07-11 LAB — HCG SERPL-ACNC: 453 MIU/ML — HIGH

## 2025-07-11 PROCEDURE — 84702 CHORIONIC GONADOTROPIN TEST: CPT

## 2025-07-11 PROCEDURE — 99284 EMERGENCY DEPT VISIT MOD MDM: CPT

## 2025-07-11 PROCEDURE — 36415 COLL VENOUS BLD VENIPUNCTURE: CPT

## 2025-07-11 NOTE — ED PROVIDER NOTE - NSFOLLOWUPINSTRUCTIONS_ED_ALL_ED_FT
Please return in 3 days for repeat blood work/exam.  Return to the ER if symptoms worsen or other concerns.      Methotrexate Treatment for an Ectopic Pregnancy  An outline of a person showing reproductive organs. A close-up shows a fetus outside of the uterus.  Methotrexate is a medicine that treats an ectopic pregnancy. In this type of pregnancy, the fertilized egg attaches (implants) outside the uterus. An ectopic pregnancy cannot develop into a healthy baby. Methotrexate works by stopping the growth of the fertilized egg. It also helps the body absorb tissue from the egg. This takes about 2–6 weeks.    An ectopic pregnancy can be life-threatening. However, most ectopic pregnancies can be successfully treated with methotrexate if they are diagnosed early.    Tell a health care provider about:  Any allergies you have.  All medicines you are taking, including vitamins, herbs, eye drops, creams, and over-the-counter medicines.  Any medical conditions you have.  What are the risks?  Your health care provider will talk with you about risks. These may include:  Digestive problems. You may have:  Nausea.  Vomiting.  Diarrhea.  Cramping in your abdomen.  Bleeding or spotting from your vagina.  Feeling dizzy or light-headed.  Mouth sores.  Inflammation of the lining of your lungs (pneumonitis).  Damage to nearby structures or organs, such as damage to the liver.  Hair loss.  There is a risk that methotrexate treatment will fail and the pregnancy will continue. There is also a risk that the ectopic pregnancy might tear or burst (rupture) during use of this medicine.    What happens before the procedure?  Blood tests will be done to check how your disease-fighting system (immune system), liver, and kidneys are working.  You will also have blood tests to measure your pregnancy hormone levels and to find out your blood type.  You will be given a shot of a medicine called Rho(D) immune globulin if:  You are Rh-negative and the father is Rh-positive.  You are Rh-negative and the father's Rh type is unknown.  What happens during the procedure?  A person giving another person an injection in the outer thigh.  Methotrexate will be injected into your muscle.  Methotrexate may be given as a single dose of medicine or a series of doses over time, depending on your response to the treatment.  Methotrexate injections are given by a health care provider. Injection is the most common way that this medicine is used to treat an ectopic pregnancy.  You may also receive other medicines to manage your ectopic pregnancy.  The procedure may vary among health care providers and hospitals.    What happens after the procedure?  After your treatment, it is common to have:  No immediate side effects.  Blood tests will be taken at timed intervals for several days or weeks to check your pregnancy hormone levels. The blood tests will continue until the pregnancy hormone can no longer be found in the blood.    Summary  Methotrexate is a medicine that treats an ectopic pregnancy. This type of pregnancy forms outside the uterus.  There is a risk that methotrexate treatment will fail and the pregnancy will continue. There is also a risk that the ectopic pregnancy might tear or burst while using this medicine.  This medicine may be given in a single dose or a series of doses over time.  After your treatment, blood tests will be done at timed intervals for several days or weeks to check your pregnancy hormone levels. The blood tests will continue until the pregnancy hormone can no longer be found in the blood.  This information is not intended to replace advice given to you by your health care provider. Make sure you discuss any questions you have with your health care provider. Please returnon 7/14 for repeat blood work/exam.  Return to the ER if symptoms worsen or other concerns.      Methotrexate Treatment for an Ectopic Pregnancy  An outline of a person showing reproductive organs. A close-up shows a fetus outside of the uterus.  Methotrexate is a medicine that treats an ectopic pregnancy. In this type of pregnancy, the fertilized egg attaches (implants) outside the uterus. An ectopic pregnancy cannot develop into a healthy baby. Methotrexate works by stopping the growth of the fertilized egg. It also helps the body absorb tissue from the egg. This takes about 2–6 weeks.    An ectopic pregnancy can be life-threatening. However, most ectopic pregnancies can be successfully treated with methotrexate if they are diagnosed early.    Tell a health care provider about:  Any allergies you have.  All medicines you are taking, including vitamins, herbs, eye drops, creams, and over-the-counter medicines.  Any medical conditions you have.  What are the risks?  Your health care provider will talk with you about risks. These may include:  Digestive problems. You may have:  Nausea.  Vomiting.  Diarrhea.  Cramping in your abdomen.  Bleeding or spotting from your vagina.  Feeling dizzy or light-headed.  Mouth sores.  Inflammation of the lining of your lungs (pneumonitis).  Damage to nearby structures or organs, such as damage to the liver.  Hair loss.  There is a risk that methotrexate treatment will fail and the pregnancy will continue. There is also a risk that the ectopic pregnancy might tear or burst (rupture) during use of this medicine.    What happens before the procedure?  Blood tests will be done to check how your disease-fighting system (immune system), liver, and kidneys are working.  You will also have blood tests to measure your pregnancy hormone levels and to find out your blood type.  You will be given a shot of a medicine called Rho(D) immune globulin if:  You are Rh-negative and the father is Rh-positive.  You are Rh-negative and the father's Rh type is unknown.  What happens during the procedure?  A person giving another person an injection in the outer thigh.  Methotrexate will be injected into your muscle.  Methotrexate may be given as a single dose of medicine or a series of doses over time, depending on your response to the treatment.  Methotrexate injections are given by a health care provider. Injection is the most common way that this medicine is used to treat an ectopic pregnancy.  You may also receive other medicines to manage your ectopic pregnancy.  The procedure may vary among health care providers and hospitals.    What happens after the procedure?  After your treatment, it is common to have:  No immediate side effects.  Blood tests will be taken at timed intervals for several days or weeks to check your pregnancy hormone levels. The blood tests will continue until the pregnancy hormone can no longer be found in the blood.    Summary  Methotrexate is a medicine that treats an ectopic pregnancy. This type of pregnancy forms outside the uterus.  There is a risk that methotrexate treatment will fail and the pregnancy will continue. There is also a risk that the ectopic pregnancy might tear or burst while using this medicine.  This medicine may be given in a single dose or a series of doses over time.  After your treatment, blood tests will be done at timed intervals for several days or weeks to check your pregnancy hormone levels. The blood tests will continue until the pregnancy hormone can no longer be found in the blood.  This information is not intended to replace advice given to you by your health care provider. Make sure you discuss any questions you have with your health care provider.

## 2025-07-11 NOTE — ED ADULT NURSE NOTE - NSFALLUNIVINTERV_ED_ALL_ED
Bed/Stretcher in lowest position, wheels locked, appropriate side rails in place/Call bell, personal items and telephone in reach/Instruct patient to call for assistance before getting out of bed/chair/stretcher/Non-slip footwear applied when patient is off stretcher/[place] to call system/Physically safe environment - no spills, clutter or unnecessary equipment/Purposeful proactive rounding/Room/bathroom lighting operational, light cord in reach

## 2025-07-11 NOTE — CONSULT NOTE ADULT - SUBJECTIVE AND OBJECTIVE BOX
31yo  s/p MTX on  for suspected left sided ectopic pregnancy, presents for repeat bHCG level. Patient states her left sided abdominal pain has improved, it was 10/10 when she presented to ED but now it is 6/10. She has been taking ibuprofen (advised patient she should avoid NSAIDs and take tylenol). She states minimal vaginal bleeding. She denies dizziness, shortness of breath, lightheadedness, severe abdominal pain or any other acute complaints at this time.     OB/GYN Hx:  G1:  arrest of dilation requiring pc/s G2:  rpt c/s largest baby 9lbs. G3 chemical. G4: CURRENT   Last PAP smear: around  normal.     PMHx: denies  SHx: denies  Meds:  none   Allergies: NKDA      PHYSICAL EXAM:   Vital Signs Last 24 Hrs  T(C): 36.8 (2025 09:13), Max: 36.8 (2025 09:13)  T(F): 98.2 (2025 09:13), Max: 98.2 (2025 09:13)  HR: 86 (2025 09:13) (86 - 86)  BP: 112/77 (2025 09:13) (112/77 - 112/77)  BP(mean): --  RR: 16 (2025 09:13) (16 - 16)  SpO2: 100% (2025 09:13) (100% - 100%)    Parameters below as of 2025 09:13  Patient On (Oxygen Delivery Method): room air        **************************  Constitutional: Alert & Oriented x3, No acute distress, sitting comfortably eating bagel  Respiratory: No increased WOB  Cardiovascular: VSS  Gastrointestinal: soft, LLQ tenderness, no rebound or guarding  Pelvic exam: deferred  Extremities: no calf tenderness or swelling      LABS:                  RADIOLOGY & ADDITIONAL STUDIES:

## 2025-07-11 NOTE — ED ADULT NURSE NOTE - CHIEF COMPLAINT QUOTE
pt stated " she is here for repeat blood work after receiving Methotrexate for ectopic pregnancy" .
pt stated " she is here for repeat blood work after receiving Methotrexate for ectopic pregnancy" .

## 2025-07-11 NOTE — ED ADULT NURSE NOTE - OBJECTIVE STATEMENT
Pt presents to ED C/O abd pain, nausea, lip swelling. Pt states, " I thought it was food poisoning but I'm not sure if it could be an allergic reaction because of the swelling".
Pt presents to ED instructed to come to ED for repeat HCG test s/p + ectopic preg dx Tuesday at [facility]. Pt reports receiving methotrexate injection. C/O mild pelvic pain.

## 2025-07-11 NOTE — ED PROVIDER NOTE - OBJECTIVE STATEMENT
LMP  presents for repeat hcg. Seen here 3 d ago with vaginal bleeding and LLQ pain found to have L adnexal ectopic pregnancy, given methotrexate. Reports some continued pain in llq, not worse but not better. No fever, chills. Mild spotting.

## 2025-07-11 NOTE — ED PROVIDER NOTE - PATIENT PORTAL LINK FT
You can access the FollowMyHealth Patient Portal offered by [facility] by registering at the following website: [email or web address]. By joining [employer]’s FollowMyHealth portal, you will also be able to view your health information using other applications (apps) compatible with our system.

## 2025-07-11 NOTE — ED ADULT NURSE NOTE - IS THE PATIENT ABLE TO BE SCREENED?
Alert and oriented/ groggy. Respirations easy and even. No respiratory distress noted. Pulse regular and non-bounding. Abdomen soft and non-tender. Peripheral pulses present.  VSS, AM meds given,pt up ambulated halls with PT, Will continue to monitor
Yes
Yes

## 2025-07-11 NOTE — CONSULT NOTE ADULT - ASSESSMENT
31yo  s/p MTX on  for suspected left sided ectopic pregnancy, presents for day 4 bHCG level  -patient is hemodynamically and clinically stable, abdomen is appropriately tender, pain is improving  -patient counseled to stop taking NSAIDs and take tylenol for pain if needed  -return precautions including severe abdominal pain, heavy vaginal bleeding, lightheadedness, dizziness, shortness of breath discussed as ectopic can still rupture until it has resolved discussed, all questions answered and patient expressed understanding  -return to ED On  for day 7 bHCG level    discussed with attending Dr. Wood    NS PGY3 29yo  s/p MTX on  for suspected left sided ectopic pregnancy, presents for day 4 bHCG level  -patient is hemodynamically and clinically stable, abdomen is appropriately tender, pain is improving  -patient counseled to stop taking NSAIDs and take tylenol for pain if needed  -return precautions including severe abdominal pain, heavy vaginal bleeding, lightheadedness, dizziness, shortness of breath discussed as ectopic can still rupture until it has resolved discussed, all questions answered and patient expressed understanding  -bHCG today is 483, return to ED On  for day 7 bHCG level  -RH positive    discussed with attending Dr. Nina QUINTANA PGY3

## 2025-07-14 ENCOUNTER — EMERGENCY (EMERGENCY)
Facility: HOSPITAL | Age: 30
LOS: 1 days | End: 2025-07-14
Admitting: EMERGENCY MEDICINE
Payer: COMMERCIAL

## 2025-07-14 VITALS
DIASTOLIC BLOOD PRESSURE: 66 MMHG | OXYGEN SATURATION: 100 % | HEART RATE: 98 BPM | WEIGHT: 154.98 LBS | TEMPERATURE: 99 F | RESPIRATION RATE: 18 BRPM | HEIGHT: 64 IN | SYSTOLIC BLOOD PRESSURE: 109 MMHG

## 2025-07-14 PROBLEM — Z78.9 OTHER SPECIFIED HEALTH STATUS: Chronic | Status: ACTIVE | Noted: 2025-07-11

## 2025-07-14 LAB
BASOPHILS # BLD AUTO: 0.06 K/UL — SIGNIFICANT CHANGE UP (ref 0–0.2)
BASOPHILS NFR BLD AUTO: 0.8 % — SIGNIFICANT CHANGE UP (ref 0–2)
EOSINOPHIL # BLD AUTO: 0.82 K/UL — HIGH (ref 0–0.5)
EOSINOPHIL NFR BLD AUTO: 10.5 % — HIGH (ref 0–6)
HCG SERPL-ACNC: 327 MIU/ML — HIGH
HCT VFR BLD CALC: 42.2 % — SIGNIFICANT CHANGE UP (ref 34.5–45)
HGB BLD-MCNC: 13.2 G/DL — SIGNIFICANT CHANGE UP (ref 11.5–15.5)
IMM GRANULOCYTES # BLD AUTO: 0.02 K/UL — SIGNIFICANT CHANGE UP (ref 0–0.07)
IMM GRANULOCYTES NFR BLD AUTO: 0.3 % — SIGNIFICANT CHANGE UP (ref 0–0.9)
LYMPHOCYTES # BLD AUTO: 2.15 K/UL — SIGNIFICANT CHANGE UP (ref 1–3.3)
LYMPHOCYTES NFR BLD AUTO: 27.6 % — SIGNIFICANT CHANGE UP (ref 13–44)
MCHC RBC-ENTMCNC: 28 PG — SIGNIFICANT CHANGE UP (ref 27–34)
MCHC RBC-ENTMCNC: 31.3 G/DL — LOW (ref 32–36)
MCV RBC AUTO: 89.6 FL — SIGNIFICANT CHANGE UP (ref 80–100)
MONOCYTES # BLD AUTO: 0.46 K/UL — SIGNIFICANT CHANGE UP (ref 0–0.9)
MONOCYTES NFR BLD AUTO: 5.9 % — SIGNIFICANT CHANGE UP (ref 2–14)
NEUTROPHILS # BLD AUTO: 4.27 K/UL — SIGNIFICANT CHANGE UP (ref 1.8–7.4)
NEUTROPHILS NFR BLD AUTO: 54.9 % — SIGNIFICANT CHANGE UP (ref 43–77)
NRBC # BLD AUTO: 0 K/UL — SIGNIFICANT CHANGE UP (ref 0–0)
NRBC # FLD: 0 K/UL — SIGNIFICANT CHANGE UP (ref 0–0)
NRBC BLD AUTO-RTO: 0 /100 WBCS — SIGNIFICANT CHANGE UP (ref 0–0)
PLATELET # BLD AUTO: 435 K/UL — HIGH (ref 150–400)
PMV BLD: 10 FL — SIGNIFICANT CHANGE UP (ref 7–13)
RBC # BLD: 4.71 M/UL — SIGNIFICANT CHANGE UP (ref 3.8–5.2)
RBC # FLD: 12.7 % — SIGNIFICANT CHANGE UP (ref 10.3–14.5)
WBC # BLD: 7.78 K/UL — SIGNIFICANT CHANGE UP (ref 3.8–10.5)
WBC # FLD AUTO: 7.78 K/UL — SIGNIFICANT CHANGE UP (ref 3.8–10.5)

## 2025-07-14 PROCEDURE — 36415 COLL VENOUS BLD VENIPUNCTURE: CPT

## 2025-07-14 PROCEDURE — 85025 COMPLETE CBC W/AUTO DIFF WBC: CPT

## 2025-07-14 PROCEDURE — 99284 EMERGENCY DEPT VISIT MOD MDM: CPT

## 2025-07-14 PROCEDURE — 84702 CHORIONIC GONADOTROPIN TEST: CPT

## 2025-07-14 NOTE — CONSULT NOTE ADULT - ASSESSMENT
31 yo  s/p MTX  fpr stephen[ected left adnexal ectopic pregnancy presents for day 7 BHCG draw. Patient has had improvement in both abdominal cramping and vaginal bleeding, to the point of being very minimal today and overall controlled. Vitals are afebrile, non-tachycardic, normotensive and otherwise hemodynamically stable. Hgb 13.2 (previously 13.2); no leukocytosis noted.    [ ] f/u BHCG draw 29 yo  s/p MTX  fpr stephen[ected left adnexal ectopic pregnancy presents for day 7 BHCG draw. Patient has had improvement in both abdominal cramping and vaginal bleeding, to the point of being very minimal today and overall controlled. Vitals are afebrile, non-tachycardic, normotensive and otherwise hemodynamically stable. Hgb 13.2 (previously 13.2); no leukocytosis noted. BHCG 327 today, representing 27% downtrend from day 4 BHCG 453 and suspected successful MTX treatment.    - VSS, Hb 13.2, O+ blood type, bHCG 327, abdominal exam benign  - She is clinically and hemodynamically stable  - Pt was counseled on avoiding folic acid containing foods, prenatal vitamins, alcohol, breastfeeding, as well as intercourse. Pt understands she cannot attempt to conceive for at least 3 months.  - Pt to follow up  with her [facility] or in the ED for day 7 bhcg check on . She is to return to the ED sooner for heavy vaginal bleeding, severe abdominal pain, or dizziness/lightheadedness      GL PGY3  Discussed with attending Dr Wood

## 2025-07-14 NOTE — ED ADULT NURSE NOTE - IN ACCORDANCE WITH [place] STATE LAW, WE OFFER EVERY PATIENT A HEPATITIS C TEST. WOULD YOU LIKE TO BE TESTED TODAY?
Opt out Please follow up with your Primary Care Physician and any specialists as discussed- GI and General Surgery.  Please take your medications as prescribed and or instructed.  If your symptoms persist or worsen, please seek care. Either return to the Emergency Department, go to urgent care or see your primary care doctor.  Please refer to general information and instructions attached or below:     Urinary Tract Infection, Adult  ImageA urinary tract infection (UTI) is an infection of any part of the urinary tract, which includes the kidneys, ureters, bladder, and urethra. These organs make, store, and get rid of urine in the body. UTI can be a bladder infection (cystitis) or kidney infection (pyelonephritis).    What are the causes?  This infection may be caused by fungi, viruses, or bacteria. Bacteria are the most common cause of UTIs. This condition can also be caused by repeated incomplete emptying of the bladder during urination.    What increases the risk?  This condition is more likely to develop if:    You ignore your need to urinate or hold urine for long periods of time.  You do not empty your bladder completely during urination.  You wipe back to front after urinating or having a bowel movement, if you are female.  You are uncircumcised, if you are male.  You are constipated.  You have a urinary catheter that stays in place (indwelling).  You have a weak defense (immune) system.  You have a medical condition that affects your bowels, kidneys, or bladder.  You have diabetes.  You take antibiotic medicines frequently or for long periods of time, and the antibiotics no longer work well against certain types of infections (antibiotic resistance).  You take medicines that irritate your urinary tract.  You are exposed to chemicals that irritate your urinary tract.  You are female.    What are the signs or symptoms?  Symptoms of this condition include:    Fever.  Frequent urination or passing small amounts of urine frequently.  Needing to urinate urgently.  Pain or burning with urination.  Urine that smells bad or unusual.  Cloudy urine.  Pain in the lower abdomen or back.  Trouble urinating.  Blood in the urine.  Vomiting or being less hungry than normal.  Diarrhea or abdominal pain.  Vaginal discharge, if you are female.    How is this diagnosed?  This condition is diagnosed with a medical history and physical exam. You will also need to provide a urine sample to test your urine. Other tests may be done, including:    Blood tests.  Sexually transmitted disease (STD) testing.    If you have had more than one UTI, a cystoscopy or imaging studies may be done to determine the cause of the infections.    How is this treated?  Treatment for this condition often includes a combination of two or more of the following:    Antibiotic medicine.  Other medicines to treat less common causes of UTI.  Over-the-counter medicines to treat pain.  Drinking enough water to stay hydrated.    Follow these instructions at home:  Take over-the-counter and prescription medicines only as told by your health care provider.  If you were prescribed an antibiotic, take it as told by your health care provider. Do not stop taking the antibiotic even if you start to feel better.  Avoid alcohol, caffeine, tea, and carbonated beverages. They can irritate your bladder.  Drink enough fluid to keep your urine clear or pale yellow.  Keep all follow-up visits as told by your health care provider. This is important.  ImageMake sure to:    Empty your bladder often and completely. Do not hold urine for long periods of time.  Empty your bladder before and after sex.  Wipe from front to back after a bowel movement if you are female. Use each tissue one time when you wipe.    Contact a health care provider if:  You have back pain.  You have a fever.  You feel nauseous or vomit.  Your symptoms do not get better after 3 days.  Your symptoms go away and then return.  Get help right away if:  You have severe back pain or lower abdominal pain.  You are vomiting and cannot keep down any medicines or water.  This information is not intended to replace advice given to you by your health care provider. Make sure you discuss any questions you have with your health care provider.     ====================================================================     Biliary Colic    WHAT YOU NEED TO KNOW:    Biliary colic is severe pain in your upper abdomen caused by a gallbladder problem. Your gallbladder stores bile, which helps break down the fats that you eat.          DISCHARGE INSTRUCTIONS:    Medicines:     Medicines can help decrease pain and muscle spasms. You may also need medicine to calm your stomach and stop vomiting.      Take your medicine as directed. Contact your healthcare provider if you think your medicine is not helping or you have side effects. Tell him if you are allergic to any medicine. Keep a list of the medicines, vitamins, and herbs you take. Include the amounts, and when and why you take them. Bring the list or the pill bottles to follow-up visits. Carry your medicine list with you in case of an emergency.    Avoid alcohol: Alcohol can damage your gallbladder and make your symptoms worse.    Maintain a healthy weight: Ask your healthcare provider how much you should weigh. Ask him to help you create a weight loss plan if you are overweight.     Eat a variety of healthy foods: Healthy foods include fruits, vegetables, whole-grain breads, low-fat dairy products, beans, lean meats, and fish. Foods that are high in fiber and low in fat and cholesterol may decrease your symptoms. Ask if you need to be on a special diet.    Exercise: Ask your healthcare provider about the best exercise plan for you. Exercise may help improve your symptoms.    Follow up with your healthcare provider as directed: Bring a list of any questions you have so you remember to ask them during your visits.     Contact your healthcare provider if:     You have a fever.      Your pain gets worse, even after you take medicine.      You have nausea or are vomiting.      Your skin or eyes are yellow.      You have questions or concerns about your condition or care.    Return to the emergency department if:     You have severe pain.      You feel like you are going to faint.      You are short of breath.

## 2025-07-14 NOTE — ED PROVIDER NOTE - CLINICAL SUMMARY MEDICAL DECISION MAKING FREE TEXT BOX
29 y/o f presents for day 7 hcg and eval s/p MTX for left ectopic on 7/8.  Pt comfortable in ED, nontender abd, vitals unremarkable.  HCG downtrending today to 327 from 453 on 7/10.  Pt seen in ED by GYN, cleared for d/c, will f/u in 1 week for another hcg test.

## 2025-07-14 NOTE — ED PROVIDER NOTE - OBJECTIVE STATEMENT
29 y/o f presents for 7 day hcg check and re-eval s/p methotrexate on 7/8 for left side ectopic.  Pt stating she is feeling well, still has mild spotting which has been improving, no pain.  Denies fever, chills, n/v/d, dysuria, all other ROS negative.

## 2025-07-14 NOTE — CONSULT NOTE ADULT - SUBJECTIVE AND OBJECTIVE BOX
PINEDA GALVAN     [id number]  29 yo  s/p MTX  fpr stephen[ected left adnexal ectopic pregnancy presents for day 7 BHCG draw. Patient reports cramping she presented with on day 4 has subsided significantly and she is now only taking tylenol as needed for control. She denies sharp or stabbing pain. Also reports vaginal bleeding has lightened to just needing 1 pad per day. Reports blood is still red but denies active bleeding at this time, and has not needed to change pad since waking today. Patient denies fever, chills, chest pain, SOB, nausea or vomiting. Reports she follows with [facility].    OB/GYN Hx:  G1:  arrest of dilation requiring pc/s G2:  rpt c/s largest baby 9lbs. G3 chemical. G4: CURRENT   Last PAP smear: around  normal.     PMHx: denies  SHx: denies  Meds:  none   Allergies: NKDA      PHYSICAL EXAM:   T(C): 37.4 (25 @ 12:00), Max: 37.4 (25 @ 12:00)  HR: 98 (25 @ 12:00) (98 - 98)  BP: 109/66 (25 @ 12:00) (109/66 - 109/66)  RR: 18 (25 @ 12:00) (18 - 18)  SpO2: 100% (25 @ 12:00) (100% - 100%)    **************************  Constitutional: No acute distress  Cardiovascular: regular rate and rhythm  Respiratory: Clear to ausculation bilaterally; no wheezing, rhonchi, or crackles  Abdomen: soft, non tender, positive bowel sounds, no rebound or guarding   Extremities: no calf tenderness or swelling bilaterally  Pelvic: deferred      LABS:                        13.2   7.78  )-----------( 435      ( 2025 12:04 )             42.2                     RADIOLOGY & ADDITIONAL STUDIES:    US transvaginal 2025:  No visible intrauterine gestation.  Possible 1.8 cm left adnexal ectopic gestation.

## 2025-07-14 NOTE — ED ADULT NURSE NOTE - OBJECTIVE STATEMENT
30yF  presents to the ER to the ER complaining of a hcg check. Patient was given methotrexate for ectopic pregnancy and told to come back for hcg. Report mild bleeding. Denies lightheadedness, N/V/D, severe bleeding, F/C.

## 2025-07-14 NOTE — ED PROVIDER NOTE - PATIENT PORTAL LINK FT
You can access the FollowMyHealth Patient Portal offered by [facility] by registering at the following website: [email or web address]. By joining [employer]’s FollowMyHealth portal, you will also be able to view your health information using other applications (apps) compatible with our system.

## 2025-07-16 DIAGNOSIS — O00.90 UNSPECIFIED ECTOPIC PREGNANCY WITHOUT INTRAUTERINE PREGNANCY: ICD-10-CM

## 2025-07-25 NOTE — CHART NOTE - NSCHARTNOTEFT_GEN_A_CORE
07-25-25 @ 15:10    Dear Ms. PINEDA GALVAN,      We are writing to follow up about your last visit to the emergency department at [facility] regarding your ongoing medical condition. We have tried contacting you several times via telephone over the last week and were not able to get a hold of you. We urgently advise you to follow up and return to [facility] emergency department, or any emergency department, for additional evaluation. Please call [phone] with any questions.       Thank you,      [facility]  OB/GYN Department   [address] [place] [address]  [phone]
Left VM advising patient to follow up with [facility] today for Day14 bhcg and if not come into the ED, gave CB #
left Vm for patient again today to ensure she followed up for Day 14 bhcg at her [facility] office.
Attempted to call patient again, no answer, left voicemail. Will send beta letter.    NS [id number]